# Patient Record
Sex: FEMALE | Race: BLACK OR AFRICAN AMERICAN | NOT HISPANIC OR LATINO | Employment: FULL TIME | URBAN - METROPOLITAN AREA
[De-identification: names, ages, dates, MRNs, and addresses within clinical notes are randomized per-mention and may not be internally consistent; named-entity substitution may affect disease eponyms.]

---

## 2022-05-20 ENCOUNTER — TELEPHONE (OUTPATIENT)
Dept: OPHTHALMOLOGY | Facility: CLINIC | Age: 37
End: 2022-05-20

## 2022-05-20 NOTE — TELEPHONE ENCOUNTER
----- Message from Olya Loving sent at 5/20/2022  2:19 PM CDT -----  Contact: Jacqui @[phone]  Pt states she is having some eye pain and would like to be seen today. 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